# Patient Record
Sex: MALE | Race: WHITE | NOT HISPANIC OR LATINO | URBAN - METROPOLITAN AREA
[De-identification: names, ages, dates, MRNs, and addresses within clinical notes are randomized per-mention and may not be internally consistent; named-entity substitution may affect disease eponyms.]

---

## 2018-10-13 ENCOUNTER — EMERGENCY (EMERGENCY)
Facility: HOSPITAL | Age: 63
LOS: 1 days | Discharge: HOME | End: 2018-10-13
Admitting: EMERGENCY MEDICINE
Payer: COMMERCIAL

## 2018-10-13 VITALS
OXYGEN SATURATION: 97 % | HEART RATE: 76 BPM | RESPIRATION RATE: 18 BRPM | DIASTOLIC BLOOD PRESSURE: 86 MMHG | SYSTOLIC BLOOD PRESSURE: 146 MMHG | WEIGHT: 165.35 LBS | TEMPERATURE: 98 F

## 2018-10-13 LAB
APPEARANCE UR: CLEAR — SIGNIFICANT CHANGE UP
BILIRUB UR-MCNC: NEGATIVE — SIGNIFICANT CHANGE UP
COLOR SPEC: YELLOW — SIGNIFICANT CHANGE UP
DIFF PNL FLD: NEGATIVE — SIGNIFICANT CHANGE UP
GLUCOSE UR QL: NEGATIVE — SIGNIFICANT CHANGE UP
KETONES UR-MCNC: NEGATIVE — SIGNIFICANT CHANGE UP
LEUKOCYTE ESTERASE UR-ACNC: NEGATIVE — SIGNIFICANT CHANGE UP
NITRITE UR-MCNC: NEGATIVE — SIGNIFICANT CHANGE UP
PH UR: 5.5 — SIGNIFICANT CHANGE UP (ref 5–8)
PROT UR-MCNC: NEGATIVE MG/DL — SIGNIFICANT CHANGE UP
SP GR SPEC: 1.02 — SIGNIFICANT CHANGE UP (ref 1–1.03)
UROBILINOGEN FLD QL: 0.2 E.U./DL — SIGNIFICANT CHANGE UP

## 2018-10-13 PROCEDURE — 87591 N.GONORRHOEAE DNA AMP PROB: CPT

## 2018-10-13 PROCEDURE — 87086 URINE CULTURE/COLONY COUNT: CPT

## 2018-10-13 PROCEDURE — 81003 URINALYSIS AUTO W/O SCOPE: CPT

## 2018-10-13 PROCEDURE — 96372 THER/PROPH/DIAG INJ SC/IM: CPT

## 2018-10-13 PROCEDURE — 99283 EMERGENCY DEPT VISIT LOW MDM: CPT | Mod: 25

## 2018-10-13 PROCEDURE — 87491 CHLMYD TRACH DNA AMP PROBE: CPT

## 2018-10-13 PROCEDURE — 99284 EMERGENCY DEPT VISIT MOD MDM: CPT

## 2018-10-13 RX ORDER — AZITHROMYCIN 500 MG/1
1000 TABLET, FILM COATED ORAL ONCE
Qty: 0 | Refills: 0 | Status: COMPLETED | OUTPATIENT
Start: 2018-10-13 | End: 2018-10-13

## 2018-10-13 RX ORDER — CEFTRIAXONE 500 MG/1
250 INJECTION, POWDER, FOR SOLUTION INTRAMUSCULAR; INTRAVENOUS ONCE
Qty: 0 | Refills: 0 | Status: COMPLETED | OUTPATIENT
Start: 2018-10-13 | End: 2018-10-13

## 2018-10-13 RX ADMIN — AZITHROMYCIN 1000 MILLIGRAM(S): 500 TABLET, FILM COATED ORAL at 17:39

## 2018-10-13 RX ADMIN — CEFTRIAXONE 250 MILLIGRAM(S): 500 INJECTION, POWDER, FOR SOLUTION INTRAMUSCULAR; INTRAVENOUS at 17:38

## 2018-10-13 NOTE — ED ADULT NURSE NOTE - OBJECTIVE STATEMENT
pt to ER w/ report that he was advised by sex partner that he should get checked for gonnorhea.  Pt denies sx, denies dysuria/discharge/cp/sob/f/c/n/v.  Breathing unlabored, skin warm and dry. Will continue to monitor.

## 2018-10-13 NOTE — ED PROVIDER NOTE - OBJECTIVE STATEMENT
61 y/o m presents stating a sexual partner of his called him and advised for him to get tested/treated for std's after he went to hospital with dysuria and was treated for GC/chlamydia.  Pt stating he thinks partner tested positive for gonorrhea.  Pt currently has no sx.  Denies fever, chills, dysuria, penile discharge, all other ROS negative.

## 2018-10-13 NOTE — ED PROVIDER NOTE - MEDICAL DECISION MAKING DETAILS
61 y/o m presents for testing/treatment after partner recently treated for sti.  Pt with no sx, gc/chlamydia sent, prefers to be treated empirically, given ceftriaxone and azithromycin in ED, recommend barrier protection, abstinence x 2 weeks

## 2018-10-13 NOTE — ED ADULT NURSE NOTE - NSIMPLEMENTINTERV_GEN_ALL_ED
Implemented All Universal Safety Interventions:  [place] to call system. Call bell, personal items and telephone within reach. Instruct patient to call for assistance. Room bathroom lighting operational. Non-slip footwear when patient is off stretcher. Physically safe environment: no spills, clutter or unnecessary equipment. Stretcher in lowest position, wheels locked, appropriate side rails in place.

## 2018-10-15 LAB
C TRACH RRNA SPEC QL NAA+PROBE: SIGNIFICANT CHANGE UP
CULTURE RESULTS: NO GROWTH — SIGNIFICANT CHANGE UP
N GONORRHOEA RRNA SPEC QL NAA+PROBE: SIGNIFICANT CHANGE UP
SPECIMEN SOURCE: SIGNIFICANT CHANGE UP
SPECIMEN SOURCE: SIGNIFICANT CHANGE UP

## 2018-10-17 DIAGNOSIS — Z20.2 CONTACT WITH AND (SUSPECTED) EXPOSURE TO INFECTIONS WITH A PREDOMINANTLY SEXUAL MODE OF TRANSMISSION: ICD-10-CM

## 2020-11-03 ENCOUNTER — APPOINTMENT (OUTPATIENT)
Dept: ORTHOPEDIC SURGERY | Facility: CLINIC | Age: 65
End: 2020-11-03
Payer: MEDICARE

## 2020-11-03 DIAGNOSIS — M25.572 PAIN IN LEFT ANKLE AND JOINTS OF LEFT FOOT: ICD-10-CM

## 2020-11-03 DIAGNOSIS — M25.561 PAIN IN RIGHT KNEE: ICD-10-CM

## 2020-11-03 PROCEDURE — 73562 X-RAY EXAM OF KNEE 3: CPT | Mod: RT

## 2020-11-03 PROCEDURE — 99072 ADDL SUPL MATRL&STAF TM PHE: CPT

## 2020-11-03 PROCEDURE — 73564 X-RAY EXAM KNEE 4 OR MORE: CPT | Mod: LT

## 2020-11-03 PROCEDURE — 99203 OFFICE O/P NEW LOW 30 MIN: CPT

## 2020-11-30 ENCOUNTER — APPOINTMENT (OUTPATIENT)
Dept: NEUROLOGY | Facility: CLINIC | Age: 65
End: 2020-11-30
Payer: MEDICARE

## 2020-11-30 VITALS
TEMPERATURE: 97.3 F | HEART RATE: 69 BPM | BODY MASS INDEX: 24.48 KG/M2 | OXYGEN SATURATION: 97 % | WEIGHT: 156 LBS | DIASTOLIC BLOOD PRESSURE: 84 MMHG | HEIGHT: 67 IN | SYSTOLIC BLOOD PRESSURE: 127 MMHG

## 2020-11-30 DIAGNOSIS — M79.605 PAIN IN LEFT LEG: ICD-10-CM

## 2020-11-30 DIAGNOSIS — M54.16 RADICULOPATHY, LUMBAR REGION: ICD-10-CM

## 2020-11-30 PROCEDURE — 99204 OFFICE O/P NEW MOD 45 MIN: CPT

## 2020-11-30 NOTE — ASSESSMENT
[FreeTextEntry1] : Symptoms are atypical for radiculopathy, however there are some signs that point toward that etiology - EDB atrophy, asymmetric reflexes, possible positive straight leg raise, some sensory asymmetry\par Will get MRI L spine, refer to PT\par Continue NSAIDs prn for now, consider neuropathic pain med such as gabapentin pending MRI result

## 2020-11-30 NOTE — PHYSICAL EXAM
[FreeTextEntry1] : Gen: appears well, well-nourished, no acute distress\par \par MS: awake, alert, oriented, speech fluent, comprehension intact, good fund of knowledge, recent and remote memory intact, attention intact\par \par CN: PERRL, EOMI, visual fields full, facial strength and sensation intact and symmetric, hearing grossly intact, palate elevation symmetric, tongue midline, no tongue atrophy or fasciculations, shoulder shrug intact and symmetric\par \par Motor: normal tone, atrophy of left EDB muscle, left EHL 4/5 otherwise 5/5 symmetric throughout \par \par Sensory: light touch decreased in left lateral lower leg, PP decreased in left dorsum of foot \par \par Reflexes: 2+ symmetric UE and patellar, right achilles 1+ and left achilles absent \par \par Coordination: no dysmetria on finger to nose, Romberg negative\par \par Gait: normal\par \par CV: 2+ pulses b/l, no edema\par \par Ophtho: fundi not visualized\par \par MSK: no tinel's sign at left fibular head\par straight leg raise equivocal on left - not clear if muscular or true radiating pain down the leg

## 2020-11-30 NOTE — CONSULT LETTER
[Dear  ___] : Dear  [unfilled], [Consult Letter:] : I had the pleasure of evaluating your patient, [unfilled]. [Please see my note below.] : Please see my note below. [Consult Closing:] : Thank you very much for allowing me to participate in the care of this patient.  If you have any questions, please do not hesitate to contact me. [Sincerely,] : Sincerely, [FreeTextEntry3] : Gopi Littlejohn M.D.\par Neurology, Electromyography and Neuromuscular Medicine\par [facility]\par \par  of Neurology\par [facility] [employer]

## 2020-11-30 NOTE — HISTORY OF PRESENT ILLNESS
[FreeTextEntry1] : CC: left leg pain \par \par HPI: 65 year old man referred by Dr. Maya for left leg pain \par \par Location: left lower leg\par Quality: "vice-"\par Severity: up to 10/10 \par Duration: 6-7 months \par Timing: worse at the end of the day \par Context: no injury \par Modifying Factors: worse with walking, standing, and tight shoes / socks \par Associated signs and symptoms: no back pain, \par \par Data reviewed:\par Imaging (reports): x-ray left knee; MRI R shoulder\par Prior records: ortho notes\par \par ROS: 13 pt review of systems performed and reviewed with patient (General, Eyes, Ears, Cardiovascular, Respiratory, Gastrointestinal, Genitourinary, Musculoskeletal, Skin, Endocrine, Hematologic, Psychiatric, Neurologic)\par Past medical history, surgical history, social history, and family history reviewed with patient\par See scanned document for details

## 2020-12-11 ENCOUNTER — APPOINTMENT (OUTPATIENT)
Dept: MRI IMAGING | Facility: CLINIC | Age: 65
End: 2020-12-11

## 2022-06-09 ENCOUNTER — EMERGENCY (EMERGENCY)
Facility: HOSPITAL | Age: 67
LOS: 1 days | Discharge: HOME | End: 2022-06-09
Attending: EMERGENCY MEDICINE | Admitting: EMERGENCY MEDICINE
Payer: MEDICARE

## 2022-06-09 VITALS
SYSTOLIC BLOOD PRESSURE: 152 MMHG | RESPIRATION RATE: 18 BRPM | DIASTOLIC BLOOD PRESSURE: 84 MMHG | OXYGEN SATURATION: 98 % | HEART RATE: 58 BPM | TEMPERATURE: 98 F

## 2022-06-09 VITALS
DIASTOLIC BLOOD PRESSURE: 58 MMHG | HEART RATE: 72 BPM | RESPIRATION RATE: 18 BRPM | SYSTOLIC BLOOD PRESSURE: 158 MMHG | WEIGHT: 149.91 LBS | HEIGHT: 67 IN | TEMPERATURE: 98 F | OXYGEN SATURATION: 97 %

## 2022-06-09 DIAGNOSIS — R07.89 OTHER CHEST PAIN: ICD-10-CM

## 2022-06-09 DIAGNOSIS — R06.02 SHORTNESS OF BREATH: ICD-10-CM

## 2022-06-09 LAB
ALBUMIN SERPL ELPH-MCNC: 4.4 G/DL — SIGNIFICANT CHANGE UP (ref 3.3–5)
ALP SERPL-CCNC: 57 U/L — SIGNIFICANT CHANGE UP (ref 40–120)
ALT FLD-CCNC: 24 U/L — SIGNIFICANT CHANGE UP (ref 10–45)
ANION GAP SERPL CALC-SCNC: 13 MMOL/L — SIGNIFICANT CHANGE UP (ref 5–17)
AST SERPL-CCNC: 20 U/L — SIGNIFICANT CHANGE UP (ref 10–40)
BASOPHILS # BLD AUTO: 0.03 K/UL — SIGNIFICANT CHANGE UP (ref 0–0.2)
BASOPHILS NFR BLD AUTO: 0.4 % — SIGNIFICANT CHANGE UP (ref 0–2)
BILIRUB SERPL-MCNC: 0.4 MG/DL — SIGNIFICANT CHANGE UP (ref 0.2–1.2)
BUN SERPL-MCNC: 19 MG/DL — SIGNIFICANT CHANGE UP (ref 7–23)
CALCIUM SERPL-MCNC: 9.6 MG/DL — SIGNIFICANT CHANGE UP (ref 8.4–10.5)
CHLORIDE SERPL-SCNC: 104 MMOL/L — SIGNIFICANT CHANGE UP (ref 96–108)
CO2 SERPL-SCNC: 25 MMOL/L — SIGNIFICANT CHANGE UP (ref 22–31)
CREAT SERPL-MCNC: 1.08 MG/DL — SIGNIFICANT CHANGE UP (ref 0.5–1.3)
EGFR: 75 ML/MIN/1.73M2 — SIGNIFICANT CHANGE UP
EOSINOPHIL # BLD AUTO: 0.34 K/UL — SIGNIFICANT CHANGE UP (ref 0–0.5)
EOSINOPHIL NFR BLD AUTO: 5.1 % — SIGNIFICANT CHANGE UP (ref 0–6)
GLUCOSE SERPL-MCNC: 93 MG/DL — SIGNIFICANT CHANGE UP (ref 70–99)
HCT VFR BLD CALC: 43.3 % — SIGNIFICANT CHANGE UP (ref 39–50)
HGB BLD-MCNC: 14.5 G/DL — SIGNIFICANT CHANGE UP (ref 13–17)
IMM GRANULOCYTES NFR BLD AUTO: 0.3 % — SIGNIFICANT CHANGE UP (ref 0–1.5)
LYMPHOCYTES # BLD AUTO: 2.24 K/UL — SIGNIFICANT CHANGE UP (ref 1–3.3)
LYMPHOCYTES # BLD AUTO: 33.5 % — SIGNIFICANT CHANGE UP (ref 13–44)
MCHC RBC-ENTMCNC: 30.2 PG — SIGNIFICANT CHANGE UP (ref 27–34)
MCHC RBC-ENTMCNC: 33.5 GM/DL — SIGNIFICANT CHANGE UP (ref 32–36)
MCV RBC AUTO: 90.2 FL — SIGNIFICANT CHANGE UP (ref 80–100)
MONOCYTES # BLD AUTO: 0.6 K/UL — SIGNIFICANT CHANGE UP (ref 0–0.9)
MONOCYTES NFR BLD AUTO: 9 % — SIGNIFICANT CHANGE UP (ref 2–14)
NEUTROPHILS # BLD AUTO: 3.45 K/UL — SIGNIFICANT CHANGE UP (ref 1.8–7.4)
NEUTROPHILS NFR BLD AUTO: 51.7 % — SIGNIFICANT CHANGE UP (ref 43–77)
NRBC # BLD: 0 /100 WBCS — SIGNIFICANT CHANGE UP (ref 0–0)
NT-PROBNP SERPL-SCNC: 25 PG/ML — SIGNIFICANT CHANGE UP (ref 0–300)
PLATELET # BLD AUTO: 380 K/UL — SIGNIFICANT CHANGE UP (ref 150–400)
POTASSIUM SERPL-MCNC: 4.7 MMOL/L — SIGNIFICANT CHANGE UP (ref 3.5–5.3)
POTASSIUM SERPL-SCNC: 4.7 MMOL/L — SIGNIFICANT CHANGE UP (ref 3.5–5.3)
PROT SERPL-MCNC: 7.1 G/DL — SIGNIFICANT CHANGE UP (ref 6–8.3)
RBC # BLD: 4.8 M/UL — SIGNIFICANT CHANGE UP (ref 4.2–5.8)
RBC # FLD: 13.1 % — SIGNIFICANT CHANGE UP (ref 10.3–14.5)
SODIUM SERPL-SCNC: 142 MMOL/L — SIGNIFICANT CHANGE UP (ref 135–145)
TROPONIN T SERPL-MCNC: 0.01 NG/ML — SIGNIFICANT CHANGE UP (ref 0–0.01)
WBC # BLD: 6.68 K/UL — SIGNIFICANT CHANGE UP (ref 3.8–10.5)
WBC # FLD AUTO: 6.68 K/UL — SIGNIFICANT CHANGE UP (ref 3.8–10.5)

## 2022-06-09 PROCEDURE — 99285 EMERGENCY DEPT VISIT HI MDM: CPT | Mod: 25

## 2022-06-09 PROCEDURE — 71046 X-RAY EXAM CHEST 2 VIEWS: CPT

## 2022-06-09 PROCEDURE — 36415 COLL VENOUS BLD VENIPUNCTURE: CPT

## 2022-06-09 PROCEDURE — 71046 X-RAY EXAM CHEST 2 VIEWS: CPT | Mod: 26

## 2022-06-09 PROCEDURE — 83880 ASSAY OF NATRIURETIC PEPTIDE: CPT

## 2022-06-09 PROCEDURE — 80053 COMPREHEN METABOLIC PANEL: CPT

## 2022-06-09 PROCEDURE — 84484 ASSAY OF TROPONIN QUANT: CPT

## 2022-06-09 PROCEDURE — 85025 COMPLETE CBC W/AUTO DIFF WBC: CPT

## 2022-06-09 PROCEDURE — 93005 ELECTROCARDIOGRAM TRACING: CPT

## 2022-06-09 NOTE — ED PROVIDER NOTE - NSFOLLOWUPINSTRUCTIONS_ED_ALL_ED_FT
It is unclear what exactly is causing your symptoms! It is important to continue following up with your doctor outside the hospital and to return to ER for: Persistent fever/vomiting, uncontrolled pain, worsening swelling, worsening breathing, worsening lightheaded, spreading redness.     Can take tylenol 650mg or motrin 600mg (May cause stomach irritation - take with food and avoid prolonged use) every 6hrs as needed for pain.    Stay well hydrated.    Follow up with primary doctor within 1-2 days.     Follow up with cardiologist. Can call [phone] ([facility]) to schedule appointment.    Can also call the following offices:    Dr. Rafaela Mccormick, Dr. Checo Conti  [phone]  [address], [address]  [place], [place]    Dr. Ben Baez  [phone]  [address], [address]  [place], [place]    Dr. Meet Wilcox  [phone]    [address], [address] [place], [place]  [phone]  Dr. Omega Menchaca, Dr. Kamar Graham, Dr. Lily Enriquez, Dr. Omega Mckeon, Dr. Nishant Rao, Dr. Mike Anne, Dr. Beth Sierra, Dr. Ben Baez    [address], [address]  [place], [place]  [phone]  Dr. Suresh Bose, Dr. Shirley Justice, Dr. Samantha Aguirre, Dr. Meet Wilcox, Dr. Ellyn Rodríguez, Dr. Yahir Barry, Dr. Ortega Kevin    [address], 4th floor  [place], [place]  [phone]  Dr. Jesse Barry, Dr. Kirby Caldwell, Dr. Chris Albright, Dr. Mike Talbot, Dr. Checo Conti, Dr. Macario Ochoa    [address], [address] [facility]  [phone]  Dr. Noah Torres, Dr. Awilda Montano, Dr. Gato Dallas, Dr. Samantha Aguirre, Dr. Malcolm García, Dr. Yahir Barry, Dr. Bryan Alvarado, Dr. Rudy Malin, Dr. Mamie Hough    [phone]  Dr. Casey Cisneros    [phone]  Dr. Shannon Anthony, Dr. Mike Anne    [address] [place], [place]  [phone]  Dr. Rafaela Mccormick    [phone]  Dr. Renea Suggs, Dr. Brad Solomon, Dr. Xiomara Herrera, Dr. Wliman Weinstein, Dr. Nicol Jasmine, Dr. Anirudh Davenport, Dr. Ellyn Hough, Dr. Thompson Dia, Dr. Beth Sierra    [phone]  Dr. Dallas Espana, Dr. Rudy Malin, Dr. Mamie Hough    [address] [facility], [address]  [place], [place]  [phone]  Dr. Tj Leahy, Dr. James Gallegos, Dr. Kd Conner, Dr. Zev Seo    [address] [place], [place]  [phone]  Dr. Tj Leahy    [address] [place], [place]  [phone]  Dr. Mike Anne    [address]  [place], [place]  [phone]  Dr. Kd Conner    [address] [place], [place]  [phone]  Dr. Ortega Kevin    [address] [place] [address]  [phone]  Dr. Mike Anne    [address] [place], [place]  [phone]  Dr. Omega Menchaca    [address] , [facility] [address]  [place], [place]  [phone]  Dr. Tj Leahy, Dr. James Gallegos, Dr. Kd Conner, Dr. Zev Seo    [address] [facility], 2nd floor  [place], [place]  [phone]  Dr. Checo Conti    [address] [place] [address], 3rd floor  [place], [phone]  Dr. Gato Dallas, Dr. Samantha Aguirre, Dr. Michael Jung    [address] [place], [place]  [phone]  Dr. Tj Leahy, Dr. James Gallegos, Dr. Kd Conner, Dr. Zev Seo     Shortness of breath    Shortness of breath (dyspnea) means you have trouble breathing and could indicate a medical problem. Causes include lung disease, heart disease, low amount of red blood cells (anemia), poor physical fitness, being overweight, smoking, etc. Your health care provider today may not be able to find a cause for your shortness of breath after your exam. In this case, it is important to have a follow-up exam with your primary care physician as instructed. If medicines were prescribed, take them as directed for the full length of time directed. Refrain from tobacco products.    SEEK IMMEDIATE MEDICAL CARE IF YOU HAVE ANY OF THE FOLLOWING SYMPTOMS: worsening shortness of breath, chest pain, back pain, abdominal pain, fever, coughing up blood, lightheadedness/dizziness.

## 2022-06-09 NOTE — ED ADULT NURSE NOTE - OBJECTIVE STATEMENT
pt. a&ox4 ambulatory no cardiac hx no qd meds, comes to ED c/o positional cp x 2 weeks unrelated to exertion, pt. reporting " I feel it when I go from a lying to a sitting position, and when I try to take a deep breath / yawn". Pt. reports pain is midsternal and radiate underneath breast on both sides around to the back. Pt. denies n/v/d, palpitations, sob, f/c, leg swelling, recent travel. pt. airway patent, breathing spontaneous and unlabored.

## 2022-06-09 NOTE — ED PROVIDER NOTE - PROGRESS NOTE DETAILS
[place]: labs, cxr grossly wnl, remains w/ no current symptoms, very well appearing. Discussed importance of outpt follow up and return precautions. Clinically no indication for further emergent ED workup or hospitalization at this time. Comfortable for dc, outpt f/u.

## 2022-06-09 NOTE — ED ADULT TRIAGE NOTE - OTHER COMPLAINTS
patient reports "I feel a pain in my chest when I take a deep breath and I feel like I can't yawn"--speaking in full, complete sentences0-- denies CP

## 2022-06-09 NOTE — ED PROVIDER NOTE - PATIENT PORTAL LINK FT
You can access the FollowMyHealth Patient Portal offered by [facility] by registering at the following website: [email or web address]. By joining [employer]’s FollowMyHealth portal, you will also be able to view your health information using other applications (apps) compatible with our system.

## 2022-06-09 NOTE — ED PROVIDER NOTE - CLINICAL SUMMARY MEDICAL DECISION MAKING FREE TEXT BOX
66M no PMH p/w SOB - pt feels that when he takes deep breaths or yawns he develops mild midsternal chest pressure that prevents him from fully yawning/breathing deeply. Sensation is intermittent, none currently. Able to perform all usual activities w/o any issues. No other systemic symptoms. No actual SOB.   Mild HTN, other vitals wnl. Exam as above.  ddx: Likely benign MSK, very low suspicion ACS. Clinically not PE, tamponade, dissection, PTX, perf, myocarditis, mediastinitis.   Labs, XR, reassess.   Pt does not want any pain meds.

## 2022-06-09 NOTE — ED PROVIDER NOTE - OBJECTIVE STATEMENT
66M no PMH p/w SOB - pt feels that when he takes deep breaths or yawns he develops mild midsternal chest pressure that prevents him from fully yawning/breathing deeply. Sensation is intermittent, none currently. Able to perform all usual activities w/o any issues. No other systemic symptoms. No actual SOB.   Denies associated nausea, vomiting, diarrhea, lightheaded, diaphoresis, palpitations, cough, rhinorrhea, black stool, bloody stool, LE pain, LE swelling, focal weakness/numbness, recent travel/immobilization, abd pain, urinary complaints, f/c. No hormone use. No FMH CAD/clots/sudden death. No prior cardiac w/u.

## 2022-06-09 NOTE — ED ADULT NURSE NOTE - NSIMPLEMENTINTERV_GEN_ALL_ED
Implemented All Universal Safety Interventions:  [place] to call system. Call bell, personal items and telephone within reach. Instruct patient to call for assistance. Room bathroom lighting operational. Non-slip footwear when patient is off stretcher. Physically safe environment: no spills, clutter or unnecessary equipment. Stretcher in lowest position, wheels locked, appropriate side rails in place.
